# Patient Record
Sex: MALE | URBAN - METROPOLITAN AREA
[De-identification: names, ages, dates, MRNs, and addresses within clinical notes are randomized per-mention and may not be internally consistent; named-entity substitution may affect disease eponyms.]

---

## 2022-12-30 ENCOUNTER — APPOINTMENT (RX ONLY)
Dept: URBAN - METROPOLITAN AREA CLINIC 236 | Facility: CLINIC | Age: 57
Setting detail: DERMATOLOGY
End: 2022-12-30

## 2022-12-30 DIAGNOSIS — L40.0 PSORIASIS VULGARIS: ICD-10-CM | Status: WELL CONTROLLED

## 2022-12-30 PROCEDURE — ? ADDITIONAL NOTES

## 2022-12-30 PROCEDURE — ? PRESCRIPTION MEDICATION MANAGEMENT

## 2022-12-30 PROCEDURE — ? COUNSELING

## 2022-12-30 PROCEDURE — 99213 OFFICE O/P EST LOW 20 MIN: CPT

## 2022-12-30 PROCEDURE — ? ORDER TESTS

## 2022-12-30 ASSESSMENT — LOCATION ZONE DERM: LOCATION ZONE: TRUNK

## 2022-12-30 ASSESSMENT — LOCATION SIMPLE DESCRIPTION DERM: LOCATION SIMPLE: RIGHT UPPER BACK

## 2022-12-30 ASSESSMENT — LOCATION DETAILED DESCRIPTION DERM: LOCATION DETAILED: RIGHT INFERIOR UPPER BACK

## 2022-12-30 NOTE — PROCEDURE: ORDER TESTS
Expected Date Of Service: 12/30/2022
Performing Laboratory: 0
Billing Type: Third-Party Bill
Bill For Surgical Tray: no

## 2022-12-30 NOTE — PROCEDURE: PRESCRIPTION MEDICATION MANAGEMENT
Samples Given: Cosentyx 2 boxes total of 4 injections
Detail Level: Zone
Render In Strict Bullet Format?: No

## 2023-04-05 ENCOUNTER — RX ONLY (OUTPATIENT)
Age: 58
Setting detail: RX ONLY
End: 2023-04-05

## 2023-04-05 RX ORDER — SECUKINUMAB 150 MG/ML
INJECTION SUBCUTANEOUS
Qty: 2 | Refills: 5 | Status: ERX | COMMUNITY
Start: 2023-04-05

## 2023-04-07 ENCOUNTER — RX ONLY (OUTPATIENT)
Age: 58
Setting detail: RX ONLY
End: 2023-04-07

## 2023-04-07 RX ORDER — SECUKINUMAB 150 MG/ML
INJECTION SUBCUTANEOUS
Qty: 2 | Refills: 5 | Status: CANCELLED

## 2023-11-15 ENCOUNTER — RX ONLY (OUTPATIENT)
Age: 58
Setting detail: RX ONLY
End: 2023-11-15

## 2023-11-15 RX ORDER — SECUKINUMAB 150 MG/ML
INJECTION SUBCUTANEOUS
Qty: 2 | Refills: 0 | Status: ERX

## 2024-01-03 ENCOUNTER — APPOINTMENT (RX ONLY)
Dept: URBAN - METROPOLITAN AREA CLINIC 236 | Facility: CLINIC | Age: 59
Setting detail: DERMATOLOGY
End: 2024-01-03

## 2024-01-03 DIAGNOSIS — L40.0 PSORIASIS VULGARIS: ICD-10-CM | Status: WELL CONTROLLED

## 2024-01-03 PROCEDURE — ? PRESCRIPTION MEDICATION MANAGEMENT

## 2024-01-03 PROCEDURE — ? COUNSELING

## 2024-01-03 PROCEDURE — ? PRESCRIPTION

## 2024-01-03 PROCEDURE — 99213 OFFICE O/P EST LOW 20 MIN: CPT

## 2024-01-03 PROCEDURE — ? PSORIATIC EPIDEMIOLOGY SCREENING TOOL (PEST)

## 2024-01-03 PROCEDURE — ? ADDITIONAL NOTES

## 2024-01-03 PROCEDURE — ? ORDER TESTS

## 2024-01-03 RX ORDER — SECUKINUMAB 300 MG/2ML
INJECTION SUBCUTANEOUS
Qty: 2 | Refills: 5 | Status: ERX | COMMUNITY
Start: 2024-01-03

## 2024-01-03 RX ADMIN — SECUKINUMAB: 300 INJECTION SUBCUTANEOUS at 00:00

## 2024-01-03 ASSESSMENT — LOCATION ZONE DERM: LOCATION ZONE: TRUNK

## 2024-01-03 ASSESSMENT — LOCATION DETAILED DESCRIPTION DERM: LOCATION DETAILED: RIGHT INFERIOR UPPER BACK

## 2024-01-03 ASSESSMENT — LOCATION SIMPLE DESCRIPTION DERM: LOCATION SIMPLE: RIGHT UPPER BACK

## 2024-01-03 NOTE — PROCEDURE: PSORIATIC EPIDEMIOLOGY SCREENING TOOL (PEST)
Negative Screening Text: A score of less than 3 is considered a negative PEST score.
Detail Level: Simple
Have You Ever Had A Swollen Joint?: No
Positive Screening Text: A score of 3 or greater is considered a positive PEST score.